# Patient Record
Sex: FEMALE | Race: WHITE
[De-identification: names, ages, dates, MRNs, and addresses within clinical notes are randomized per-mention and may not be internally consistent; named-entity substitution may affect disease eponyms.]

---
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
## 2021-02-16 NOTE — ERD
ER Documentation


Chief Complaint


Date/Time


DATE: 3/10/17 


TIME: 21:09


Chief Complaint


bilateral hand pain sustained when she punch the wall





HPI


This 16-year-old female presents with bilateral hand pain after punching a wall 

in anger today.  She was in a group home is brought by the caretakers agreed 

home.  She denies any wrist pain she complains of pain in the bilateral fourth 

and fifth metacarpal areas.  She has bruising and swelling in and restricted 

range of motion due to pain but no weakness.  She denies any other pain or 

injury related to her hitting a wall in anger today per





ROS


All systems reviewed and are negative except as per history of present illness.





Medications


Home Meds


Active Scripts


Ibuprofen* (Motrin*) 600 Mg Tab, 600 MG PO Q6, #15 TAB


   Prov:WILNER SOLORIO MD         3/10/17


Ibuprofen* (Motrin*) 400 Mg Tab, 400 MG PO Q6, #30 TAB


   Prov:SHARIF OGLESBYC         2/17/17


Acetaminophen* (Tylenol*) 325 Mg Tablet, 1 TAB PO Q6 Y for PAIN AND OR ELEVATED 

TEMP, #30 TAB


   Prov:HONG WEST PA-C         6/23/16


Ibuprofen* (Motrin*) 400 Mg Tab, 400 MG PO Q6, #30 TAB


   Prov:HONG WEST PA-C         6/23/16


Ondansetron Hcl* (Zofran* ODT) 4 mg -ODT Tab.disper, 4 MG PO Q8 Y for NAUSEA AND

/OR VOMITING, #30 TAB


   Prov:DAKSHA MARCUS NP         3/10/16


Dicyclomine Hcl* (Bentyl*) 10 Mg Capsule, 10 MG PO QID for abdominal cramping, #

10 CAP


   Prov:DAKSHA MARCUS NP         3/10/16


Ibuprofen* (Motrin*) 400 Mg Tab, 400 MG PO Q6H Y for PAIN AND OR ELEVATED TEMP, 

#30 TAB


   Prov:DAKSHA MARCUS NP         3/10/16


Reported Medications


Naproxen* (Flanax*) Unknown Strength Tablet, PO BID, TAB


   3/10/16


Acetaminophen* (Tylenol*) 160 Mg/5 Ml Soln, 320 MG PO Q6


   11/15/13





Allergies


Allergies:  


Coded Allergies:  


     No Known Allergy (Unverified , 2/17/17)





PMhx/Soc


Medical and Surgical Hx:  pt denies Medical Hx, pt denies Surgical Hx


History of Surgery:  No


Anesthesia Reaction:  No


Hx Neurological Disorder:  No


Hx Respiratory Disorders:  No


Hx Cardiac Disorders:  No


Hx Psychiatric Problems:  No


Hx Miscellaneous Medical Probl:  No


Hx Alcohol Use:  No


Hx Substance Use:  No


Hx Tobacco Use:  No


Smoking Status:  Never smoker





Physical Exam


Vitals





Vital Signs








  Date Time  Temp Pulse Resp B/P Pulse Ox O2 Delivery O2 Flow Rate FiO2


 


3/10/17 19:05 97.8 85 20 109/59 100   








Physical Exam


Const:  [] Alert, non-ill-appearing per


Head:   Atraumatic 


Eyes:    Normal Conjunctiva


ENT:    Normal External Ears, Nose and Mouth.


Neck:               Full range of motion..~ No meningismus.


Resp:    Clear to auscultation bilaterally


Cardio:    Regular rate and rhythm, no murmurs


Abd:    Soft, non tender, non distended. Normal bowel sounds


Skin:    No petechiae or rashes


Back:    No midline or flank tenderness


Ext:    No cyanosis, or edema.  Bruising on tenderness in the bilateral fourth 

and fifth metacarpal areas of bilateral hands.  There is no restricted range of 

motion weakness or tendon or neurologic deficits appreciated there is no 

bleeding or erythema.  There is no appreciable snuffbox tenderness.


Neur:    Awake and alert


Psych:    Normal Mood and Affect


Results 24 hrs





 Current Medications








 Medications


  (Trade)  Dose


 Ordered  Sig/Michel


 Route


 PRN Reason  Start Time


 Stop Time Status Last Admin


Dose Admin


 


 Ibuprofen


  (Motrin)  600 mg  ONCE  ONCE


 PO


   3/10/17 20:30


 3/10/17 20:31 DC 3/10/17 20:38


 











Procedures/MDM


X-ray Hand bilateral 3V interpreted by me: 


Scaphoid:   [Normal]


Bones:    [No fracture]


Joints:       [No dislocation]


Foreign body:    [None].  Patient-bilateral hand x-ray normal.





Patient is placed in bilateral wrist Velcro braces.  Patient is neurovascular 

to have the braces.  Patient signs and symptoms of bilateral hand contusion 

without signs or symptoms of fracture, dislocation, tendon or neurologic 

deficit or bacterial infection.  She will discharged home with short course of 

ibuprofen and instructions to follow-up with her PCP and possibly orthopedist 

for pain next week or return sooner for new or worsening symptoms as directed 

after instructions.





Departure


Diagnosis:  


 Primary Impression:  


 Injury of hand


 Encounter type:  initial encounter  Laterality:  unspecified laterality  

Qualified Code:  S69.90XA - Injury of hand, unspecified laterality, initial 

encounter


Condition:  Stable


Patient Instructions:  Sprain Hand





Additional Instructions:  


X-rays read as normal.  Recheck with primary doctor orthopedist for pain next 

week.











WILNER SOLORIO MD Mar 10, 2017 21:10 PAST SURGICAL HISTORY:  Biventricular ICD (implantable cardioverter-defibrillator) in place 2010    [facility]ernia 2 surgeries asbout 35 years ago.     PAST SURGICAL HISTORY:  Biventricular ICD (implantable cardioverter-defibrillator) in place 2010    [facility]     Stented coronary artery X 2, 2009

## 2021-06-20 ENCOUNTER — HOSPITAL ENCOUNTER (EMERGENCY)
Dept: HOSPITAL 27 - EMS | Age: 21
Discharge: HOME | End: 2021-06-20
Payer: COMMERCIAL

## 2021-06-20 VITALS — BODY MASS INDEX: 25.82 KG/M2 | HEIGHT: 68 IN | WEIGHT: 170.35 LBS

## 2021-06-20 VITALS — SYSTOLIC BLOOD PRESSURE: 122 MMHG | DIASTOLIC BLOOD PRESSURE: 67 MMHG
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
## 2021-06-20 DIAGNOSIS — S91.202A: Primary | ICD-10-CM

## 2021-06-20 PROCEDURE — 11730 AVULSION NAIL PLATE SIMPLE 1: CPT

## 2021-06-20 PROCEDURE — 99284 EMERGENCY DEPT VISIT MOD MDM: CPT

## 2024-01-04 ENCOUNTER — HOSPITAL ENCOUNTER (EMERGENCY)
Dept: HOSPITAL 27 - EMS | Age: 24
Discharge: HOME | End: 2024-01-04
Payer: COMMERCIAL

## 2024-01-04 VITALS — HEART RATE: 77 BPM | SYSTOLIC BLOOD PRESSURE: 116 MMHG | DIASTOLIC BLOOD PRESSURE: 65 MMHG | RESPIRATION RATE: 16 BRPM

## 2024-01-04 VITALS — TEMPERATURE: 97.9 F

## 2024-01-04 VITALS — HEIGHT: 60.5 IN | BODY MASS INDEX: 42.17 KG/M2 | WEIGHT: 220.46 LBS
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
## 2024-01-04 DIAGNOSIS — F12.90: ICD-10-CM
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
## 2024-01-04 DIAGNOSIS — S39.012A: Primary | ICD-10-CM

## 2024-01-04 LAB
BASOPHILS # BLD AUTO: 0 K/UL (ref 0–0.2)
BASOPHILS NFR BLD AUTO: 0.5 % (ref 0–2)
EOSINOPHIL # BLD AUTO: 0.1 K/UL (ref 0–0.7)
EOSINOPHIL NFR BLD AUTO: 0.9 % (ref 1–6)
ERYTHROCYTE [DISTWIDTH] IN BLOOD BY AUTOMATED COUNT: 15.2 % (ref 11.5–14.5)
HCT VFR BLD AUTO: 41 % (ref 36–46)
HGB BLD-MCNC: 13.8 G/DL (ref 12–16)
LYMPHOCYTES # BLD AUTO: 3.7 K/UL (ref 1–4.8)
LYMPHOCYTES NFR BLD AUTO: 40.4 % (ref 22–44)
MCH RBC QN AUTO: 30.9 PG (ref 26–34)
MCHC RBC AUTO-ENTMCNC: 33.6 G/DL (ref 31–37)
MCV RBC AUTO: 92 FL (ref 80–100)
MONOCYTES # BLD AUTO: 0.8 K/UL (ref 0.1–1)
MONOCYTES NFR BLD AUTO: 9 % (ref 2–9)
NEUTROPHILS # BLD AUTO: 4.6 K/UL (ref 1.8–7.7)
NEUTROPHILS NFR BLD AUTO: 49.2 % (ref 40–70)
PLATELET # BLD AUTO: 321 K/UL (ref 150–450)
RBC # BLD AUTO: 4.46 MIL/UL (ref 4–5.2)
WBC # BLD AUTO: 9.2 K/UL (ref 4.5–11)

## 2024-01-04 PROCEDURE — 99284 EMERGENCY DEPT VISIT MOD MDM: CPT

## 2024-01-04 PROCEDURE — 36415 COLL VENOUS BLD VENIPUNCTURE: CPT

## 2024-01-04 PROCEDURE — 72100 X-RAY EXAM L-S SPINE 2/3 VWS: CPT

## 2024-01-04 PROCEDURE — 96374 THER/PROPH/DIAG INJ IV PUSH: CPT

## 2024-01-04 PROCEDURE — 85025 COMPLETE CBC W/AUTO DIFF WBC: CPT

## 2024-01-04 PROCEDURE — 96375 TX/PRO/DX INJ NEW DRUG ADDON: CPT

## 2024-01-04 PROCEDURE — 84703 CHORIONIC GONADOTROPIN ASSAY: CPT

## 2024-01-17 ENCOUNTER — HOSPITAL ENCOUNTER (EMERGENCY)
Dept: HOSPITAL 87 - ER | Age: 24
Discharge: LEFT BEFORE DISCHARGE | End: 2024-01-17
Payer: MEDICAID

## 2024-01-17 VITALS
RESPIRATION RATE: 18 BRPM | HEART RATE: 102 BPM | OXYGEN SATURATION: 96 % | DIASTOLIC BLOOD PRESSURE: 98 MMHG | SYSTOLIC BLOOD PRESSURE: 135 MMHG | TEMPERATURE: 98.6 F

## 2024-01-17 VITALS — WEIGHT: 209.44 LBS | HEIGHT: 64 IN | BODY MASS INDEX: 35.76 KG/M2

## 2024-01-17 DIAGNOSIS — Z53.21: ICD-10-CM

## 2024-01-17 DIAGNOSIS — H92.03: Primary | ICD-10-CM

## 2024-01-17 PROCEDURE — 99281 EMR DPT VST MAYX REQ PHY/QHP: CPT
